# Patient Record
Sex: FEMALE | Race: AMERICAN INDIAN OR ALASKA NATIVE
[De-identification: names, ages, dates, MRNs, and addresses within clinical notes are randomized per-mention and may not be internally consistent; named-entity substitution may affect disease eponyms.]

---

## 2019-01-20 ENCOUNTER — HOSPITAL ENCOUNTER (EMERGENCY)
Dept: HOSPITAL 14 - H.ER | Age: 54
LOS: 1 days | Discharge: HOME | End: 2019-01-21
Payer: MEDICAID

## 2019-01-20 VITALS — RESPIRATION RATE: 18 BRPM

## 2019-01-20 DIAGNOSIS — R07.81: Primary | ICD-10-CM

## 2019-01-20 DIAGNOSIS — I10: ICD-10-CM

## 2019-01-21 VITALS
TEMPERATURE: 98.4 F | OXYGEN SATURATION: 100 % | HEART RATE: 68 BPM | DIASTOLIC BLOOD PRESSURE: 96 MMHG | SYSTOLIC BLOOD PRESSURE: 137 MMHG

## 2019-01-21 LAB
ALBUMIN SERPL-MCNC: 4.3 G/DL (ref 3.5–5)
ALBUMIN/GLOB SERPL: 1.3 {RATIO} (ref 1–2.1)
ALT SERPL-CCNC: 21 U/L (ref 9–52)
AST SERPL-CCNC: 28 U/L (ref 14–36)
BASOPHILS # BLD AUTO: 0 K/UL (ref 0–0.2)
BASOPHILS NFR BLD: 0.5 % (ref 0–2)
BUN SERPL-MCNC: 16 MG/DL (ref 7–17)
CALCIUM SERPL-MCNC: 9.3 MG/DL (ref 8.4–10.2)
EOSINOPHIL # BLD AUTO: 0.3 K/UL (ref 0–0.7)
EOSINOPHIL NFR BLD: 4.1 % (ref 0–4)
ERYTHROCYTE [DISTWIDTH] IN BLOOD BY AUTOMATED COUNT: 15.5 % (ref 11.5–14.5)
GFR NON-AFRICAN AMERICAN: > 60
HGB BLD-MCNC: 12.5 G/DL (ref 12–16)
LYMPHOCYTES # BLD AUTO: 3.1 K/UL (ref 1–4.3)
LYMPHOCYTES NFR BLD AUTO: 40.1 % (ref 20–40)
MCH RBC QN AUTO: 31.2 PG (ref 27–31)
MCHC RBC AUTO-ENTMCNC: 35.3 G/DL (ref 33–37)
MCV RBC AUTO: 88.2 FL (ref 81–99)
MONOCYTES # BLD: 0.5 K/UL (ref 0–0.8)
MONOCYTES NFR BLD: 6.8 % (ref 0–10)
NEUTROPHILS # BLD: 3.8 K/UL (ref 1.8–7)
NEUTROPHILS NFR BLD AUTO: 48.5 % (ref 50–75)
NRBC BLD AUTO-RTO: 0 % (ref 0–0)
PLATELET # BLD: 214 K/UL (ref 130–400)
PMV BLD AUTO: 7.8 FL (ref 7.2–11.7)
RBC # BLD AUTO: 4.03 MIL/UL (ref 3.8–5.2)
WBC # BLD AUTO: 7.7 K/UL (ref 4.8–10.8)

## 2019-01-21 NOTE — RAD
Date of service: 



01/21/2019



HISTORY:

 cough 



COMPARISON:

No prior.



TECHNIQUE:

Chest PA and lateral



FINDINGS:



LUNGS:

No active pulmonary disease.



PLEURA:

No significant pleural effusion identified. No pneumothorax apparent.



CARDIOVASCULAR:

No aortic atherosclerotic calcification present.



Normal cardiac size. No pulmonary vascular congestion. 



OSSEOUS STRUCTURES:

No significant abnormalities.



VISUALIZED UPPER ABDOMEN:

Normal.



OTHER FINDINGS:

None.



IMPRESSION:

No active disease.

## 2019-01-21 NOTE — ED PDOC
HPI: Chest Pain


Time Seen by Provider: 01/20/19 23:30


Chief Complaint (Nursing): Chest Pain


Chief Complaint (Provider): Chest Pain


History Per: Patient


History/Exam Limitations: no limitations


Onset/Duration Of Symptoms: Days (x2), Intermittent Episodes


Current Symptoms Are (Timing): Still Present


Associated Symptoms: denies: Nausea, Dyspnea, Diaphoresis, Syncope


Exacerbating Factors: Deep Breathing, Other (coughing)


Additional Complaint(s): 


54 y/o  female with a PMHx of HTN presents to the ED for ev

aluation of intermittent chest pain. Patient reports she was recently started on

amoxicillin for a throat infection. Patient states pain has improved but has now

developed intermittent chest pain associated with some chills and a dry cough. 

Patient reports of taking anti-tussives and ibuprofen with no significant change

in symptoms. Patient states pain is worse with deep inhalation and coughing.  

Otherwise, patient denies nausea, vomiting and diaphoresis. 








PMD: Olman Quevedo





Past Medical History


Reviewed: Historical Data, Nursing Documentation, Vital Signs


Vital Signs: 





                                Last Vital Signs











Temp  98.2 F   01/20/19 23:05


 


Pulse  91 H  01/20/19 23:05


 


Resp  18   01/20/19 23:05


 


BP  166/116 H  01/20/19 23:05


 


Pulse Ox  97   01/20/19 23:05














- Medical History


PMH: HTN





- Surgical History


Surgical History: No Surg Hx





- Family History


Family History: States: Unknown Family Hx





- Social History


Current smoker - smoking cessation education provided: No


Alcohol: None


Drugs: Denies





- Home Medications


Home Medications: 


                                Ambulatory Orders











 Medication  Instructions  Recorded


 


LORazepam [Ativan] 2 mg PO HS 10/13/16


 


Losartan [Cozaar] 50 mg PO DAILY 10/13/16


 


oxyCODONE [oxyCODONE Immediate 15 mg PO BID PRN 10/13/16





Release Tab]  


 


Cyclobenzaprine [Cyclobenzaprine 10 mg PO DAILY PRN 10/14/16





HCl]  


 


traMADol [Ultram] 50 mg PO TID PRN 10/14/16


 


traMADol [Ultram] 50 mg PO TID PRN #16 tab 10/14/16














- Allergies


Allergies/Adverse Reactions: 


                                    Allergies











Allergy/AdvReac Type Severity Reaction Status Date / Time


 


latex Allergy  ANAPHYLAXIS Verified 10/13/16 23:06














Review of Systems


ROS Statement: Except As Marked, All Systems Reviewed And Found Negative


Constitutional: Positive for: Chills.  Negative for: Sweats


Cardiovascular: Positive for: Chest Pain


Respiratory: Positive for: Cough


Gastrointestinal: Negative for: Nausea, Vomiting





Physical Exam





- Reviewed


Nursing Documentation Reviewed: Yes


Vital Signs Reviewed: Yes





- Physical Exam


Appears: Positive for: No Acute Distress


Head Exam: Positive for: ATRAUMATIC, NORMOCEPHALIC


Skin: Positive for: Normal Color, Warm, Dry


Eye Exam: Positive for: Normal appearance, EOMI, PERRL


Neck: Positive for: Normal, Painless ROM, Supple


Cardiovascular/Chest: Positive for: Regular Rate, Rhythm.  Negative for: Murmur


Respiratory: Positive for: Normal Breath Sounds.  Negative for: Respiratory 

Distress


Gastrointestinal/Abdominal: Positive for: Normal Exam, Soft.  Negative for: 

Tenderness


Back: Positive for: Normal Inspection.  Negative for: L CVA Tenderness, R CVA 

Tenderness, Vertebral Tenderness


Extremity: Positive for: Normal ROM.  Negative for: Deformity


Neurologic/Psych: Positive for: Alert, Oriented.  Negative for: Motor/Sensory 

Deficits





- Laboratory Results


Result Diagrams: 


                                 01/21/19 00:20





                                 01/21/19 00:20





- ECG


ECG Rhythm: Positive for: Sinus Rhythm (23/)


Rate: 66


O2 Sat by Pulse Oximetry: 97 (RA)


Pulse Ox Interpretation: Normal





Medical Decision Making


Medical Decision Making: 


Time: 2309


Impression: 54 y/o  female with chest pain, in setting of recent

 URI. 


Plan:


-- EKG





Time: 2350


Plan:


-- Heplock Insertion


-- CBC with Differentials


-- Troponin I


-- CMP





Time: 0001


Plan:


-- Influenza A B





-- Patient declined any pain medications. 





Time: 0110


-- CXR shows no active disease. Labs reviewed and demonstrate no clinically 

significant abnormalities. Patient is stable for discharge home with a diagnosis

 of pleuritic chest pain. 





____________________________________________________________________

___________________


Scribe Attestation:


Documented by Micheal Seaman, acting as a scribe for Jarad Polanco MD.





Provider Scribe Attestation:


All medical record entries made by the Scribe were at my direction and 

personally dictated by me. I have reviewed the chart and agree that the record 

accurately reflects my personal performance of the history, physical exam, 

medical decision making, and the department course for this patient. I have also

 personally directed, reviewed, and agree with the discharge instructions and 

disposition.





Disposition





- Clinical Impression


Clinical Impression: 


 Pleuritic chest pain








- Patient ED Disposition


Is Patient to be Admitted: No


Counseled Patient/Family Regarding: Studies Performed, Diagnosis





- Disposition


Disposition: Routine/Home


Disposition Time: 01:10


Condition: STABLE


Instructions:  Pleuritic Chest Pain


Forms:  [facility] (English)

## 2019-01-21 NOTE — CARD
--------------- APPROVED REPORT --------------





Date of service: 01/20/2019



EKG Measurement

Heart [id number]

UT [id number]

[id number]

[id number]

[id number]



<Conclusion>

Sinus rhythm with marked sinus arrhythmia

Nonspecific T wave abnormality

Abnormal ECG